# Patient Record
Sex: MALE | URBAN - METROPOLITAN AREA
[De-identification: names, ages, dates, MRNs, and addresses within clinical notes are randomized per-mention and may not be internally consistent; named-entity substitution may affect disease eponyms.]

---

## 2020-03-11 ENCOUNTER — OFFICE VISIT (OUTPATIENT)
Dept: URBAN - METROPOLITAN AREA CLINIC 71 | Facility: CLINIC | Age: 65
End: 2020-03-11
Payer: MEDICARE

## 2020-03-11 DIAGNOSIS — H43.811 VITREOUS DEGENERATION, RIGHT EYE: Primary | ICD-10-CM

## 2020-03-11 DIAGNOSIS — H25.13 AGE-RELATED NUCLEAR CATARACT, BILATERAL: ICD-10-CM

## 2020-03-11 PROCEDURE — 92004 COMPRE OPH EXAM NEW PT 1/>: CPT | Performed by: OPTOMETRIST

## 2020-03-11 ASSESSMENT — INTRAOCULAR PRESSURE
OS: 9
OD: 14

## 2020-03-11 NOTE — IMPRESSION/PLAN
Impression: Vitreous degeneration, right eye: H43.811. Discussed in detail with patient. Vitreous attached to optic nerve has started to detach. Discussed s/s of retinal detachment. No retinal detachment seen today. Instructed patient to call if any sign or symptoms occur. RD would usually occur within first 6 weeks of initial vitreous degeneration. Floaters and flashes should either stay the way or get better with time. Any worsening patient is instructed to be seen. Plan: Will have patient return in month with dilation to re-evaluate.

## 2020-03-11 NOTE — IMPRESSION/PLAN
Impression: Age-related nuclear cataract, bilateral: H25.13. Plan: No recommendation for cataract surgery at this time. Will monitor annually for changes or progression.